# Patient Record
Sex: MALE | Race: BLACK OR AFRICAN AMERICAN
[De-identification: names, ages, dates, MRNs, and addresses within clinical notes are randomized per-mention and may not be internally consistent; named-entity substitution may affect disease eponyms.]

---

## 2022-07-03 ENCOUNTER — HOSPITAL ENCOUNTER (EMERGENCY)
Dept: HOSPITAL 12 - ER | Age: 42
Discharge: HOME | End: 2022-07-03
Payer: COMMERCIAL

## 2022-07-03 VITALS — HEIGHT: 70 IN | BODY MASS INDEX: 30.06 KG/M2 | WEIGHT: 210 LBS

## 2022-07-03 VITALS — DIASTOLIC BLOOD PRESSURE: 80 MMHG | SYSTOLIC BLOOD PRESSURE: 120 MMHG

## 2022-07-03 DIAGNOSIS — R40.0: Primary | ICD-10-CM

## 2022-07-03 DIAGNOSIS — R91.8: ICD-10-CM

## 2022-07-03 LAB
ALP SERPL-CCNC: 60 U/L (ref 50–136)
ALT SERPL W/O P-5'-P-CCNC: 36 U/L (ref 16–63)
APAP SERPL-MCNC: < 2 UG/ML (ref 10–30)
APPEARANCE UR: CLEAR
AST SERPL-CCNC: 28 U/L (ref 15–37)
BILIRUB DIRECT SERPL-MCNC: 0.1 MG/DL (ref 0–0.2)
BILIRUB SERPL-MCNC: 0.5 MG/DL (ref 0.2–1)
BILIRUB UR QL STRIP: NEGATIVE
BUN SERPL-MCNC: 16 MG/DL (ref 7–18)
CHLORIDE SERPL-SCNC: 106 MMOL/L (ref 98–107)
CO2 SERPL-SCNC: 31 MMOL/L (ref 21–32)
COLOR UR: YELLOW
CREAT SERPL-MCNC: 1.3 MG/DL (ref 0.6–1.3)
ETHANOL SERPL-MCNC: < 3 MG/DL (ref 0–0)
GLUCOSE SERPL-MCNC: 81 MG/DL (ref 74–106)
GLUCOSE UR STRIP-MCNC: NEGATIVE MG/DL
HCT VFR BLD AUTO: 51.7 % (ref 36.7–47.1)
HGB UR QL STRIP: NEGATIVE
KETONES UR STRIP-MCNC: NEGATIVE MG/DL
LEUKOCYTE ESTERASE UR QL STRIP: NEGATIVE
MCH RBC QN AUTO: 30.4 UUG (ref 23.8–33.4)
MCV RBC AUTO: 88.5 FL (ref 73–96.2)
NITRITE UR QL STRIP: NEGATIVE
PH UR STRIP: 5.5 [PH] (ref 5–8)
PLATELET # BLD AUTO: 250 K/UL (ref 152–348)
POTASSIUM SERPL-SCNC: 4.2 MMOL/L (ref 3.5–5.1)
SP GR UR STRIP: >=1.03 (ref 1–1.03)
TSH SERPL DL<=0.005 MIU/L-ACNC: 1.45 MIU/ML (ref 0.36–3.74)
UROBILINOGEN UR STRIP-MCNC: 0.2 E.U./DL
WS STN SPEC: 7.2 G/DL (ref 6.4–8.2)

## 2022-07-03 PROCEDURE — G0480 DRUG TEST DEF 1-7 CLASSES: HCPCS

## 2022-07-03 NOTE — NUR
Dr. Fatima at bedside speaking with pt and his wife. Impression: Glaucoma suspicion: H40.9. Plan: IOP ok, no FH. Rec obs.

## 2022-07-03 NOTE — NUR
in and out cath was not performed as pt was able to urinate.

previous ekg was not performed as the pt previously refused.

## 2022-07-03 NOTE — NUR
pt laying in bed only briefly wakes up when called.  pt's wife at bedside.  I 
informed the pt we need a urine sample.

## 2022-07-03 NOTE — NUR
Patient discharged to home in stable condition.  Written and verbal after care 
instructions given. 

Patient verbalizes understanding of instructions. Stressed follow up or return 
to ER for worsening s/s.

security was called and escorted the pt and his wife out of the er as the pt 
was refusing to leave the er.

## 2022-07-03 NOTE — NUR
call to security as pt and the wife of the pt does not want to leave.  the pt 
has been discharged and was told three times.

## 2022-07-04 LAB
AMPHETAMINES UR QL SCN>1000 NG/ML: NEGATIVE
COCAINE UR QL SCN: POSITIVE
OPIATES UR QL SCN: NEGATIVE
PCP UR QL SCN>25 NG/ML: NEGATIVE
THC UR QL SCN>50 NG/ML: NEGATIVE